# Patient Record
Sex: MALE | Race: WHITE | NOT HISPANIC OR LATINO | Employment: FULL TIME | URBAN - METROPOLITAN AREA
[De-identification: names, ages, dates, MRNs, and addresses within clinical notes are randomized per-mention and may not be internally consistent; named-entity substitution may affect disease eponyms.]

---

## 2023-09-12 PROBLEM — F32.A CHRONIC DEPRESSION: Status: ACTIVE | Noted: 2023-09-12

## 2023-09-12 PROBLEM — K21.9 GASTROESOPHAGEAL REFLUX DISEASE: Status: ACTIVE | Noted: 2023-09-12

## 2023-09-12 PROBLEM — E66.9 OBESITY: Status: ACTIVE | Noted: 2023-09-12

## 2023-09-12 PROBLEM — E78.5 HYPERLIPIDEMIA: Status: ACTIVE | Noted: 2023-09-12

## 2023-09-12 PROBLEM — T78.40XA ALLERGIC REACTION: Status: ACTIVE | Noted: 2023-09-12

## 2023-09-12 PROBLEM — I10 BENIGN HYPERTENSION: Status: ACTIVE | Noted: 2023-09-12

## 2023-09-12 RX ORDER — TRIAMCINOLONE ACETONIDE 1 MG/G
1 CREAM TOPICAL 2 TIMES DAILY
COMMUNITY
Start: 2022-06-23 | End: 2024-04-01 | Stop reason: WASHOUT

## 2023-09-12 RX ORDER — ESCITALOPRAM OXALATE 5 MG/1
1 TABLET ORAL DAILY
COMMUNITY
Start: 2015-12-14 | End: 2024-01-24 | Stop reason: ALTCHOICE

## 2023-09-12 RX ORDER — FLUTICASONE PROPIONATE 50 MCG
1 SPRAY, SUSPENSION (ML) NASAL DAILY
COMMUNITY
Start: 2018-03-23

## 2023-09-12 RX ORDER — ESCITALOPRAM OXALATE 10 MG/1
10 TABLET ORAL DAILY
COMMUNITY
Start: 2015-10-27

## 2023-09-12 RX ORDER — ATORVASTATIN CALCIUM 10 MG/1
10 TABLET, FILM COATED ORAL DAILY
COMMUNITY
Start: 2017-04-18

## 2023-09-12 RX ORDER — LOSARTAN POTASSIUM AND HYDROCHLOROTHIAZIDE 25; 100 MG/1; MG/1
1 TABLET ORAL DAILY
COMMUNITY
Start: 2015-10-27

## 2023-09-29 ENCOUNTER — APPOINTMENT (OUTPATIENT)
Dept: PRIMARY CARE | Facility: CLINIC | Age: 38
End: 2023-09-29

## 2023-09-29 ENCOUNTER — HOSPITAL ENCOUNTER (OUTPATIENT)
Dept: DATA CONVERSION | Facility: HOSPITAL | Age: 38
Discharge: HOME | End: 2023-09-29
Payer: COMMERCIAL

## 2023-09-29 DIAGNOSIS — E66.01 MORBID (SEVERE) OBESITY DUE TO EXCESS CALORIES (MULTI): ICD-10-CM

## 2023-09-29 LAB
ALBUMIN SERPL-MCNC: 4.5 GM/DL (ref 3.5–5)
ALBUMIN/GLOB SERPL: 1.5 RATIO (ref 1.5–3)
ALP BLD-CCNC: 78 U/L (ref 35–125)
ALT SERPL-CCNC: 28 U/L (ref 5–40)
ANION GAP SERPL CALCULATED.3IONS-SCNC: 13 MMOL/L (ref 0–19)
APPEARANCE PLAS: ABNORMAL
AST SERPL-CCNC: 27 U/L (ref 5–40)
BASOPHILS # BLD AUTO: 0.04 K/UL (ref 0–0.22)
BASOPHILS NFR BLD AUTO: 0.4 % (ref 0–1)
BILIRUB SERPL-MCNC: 1.4 MG/DL (ref 0.1–1.2)
BUN SERPL-MCNC: 11 MG/DL (ref 8–25)
BUN/CREAT SERPL: 12.2 RATIO (ref 8–21)
CALCIUM SERPL-MCNC: 10.2 MG/DL (ref 8.5–10.4)
CHLORIDE SERPL-SCNC: 100 MMOL/L (ref 97–107)
CHOLEST SERPL-MCNC: 177 MG/DL (ref 133–200)
CHOLEST/HDLC SERPL: 4.8 RATIO
CO2 SERPL-SCNC: 27 MMOL/L (ref 24–31)
COLOR SPUN FLD: YELLOW
CREAT SERPL-MCNC: 0.9 MG/DL (ref 0.4–1.6)
DEPRECATED RDW RBC AUTO: 41.9 FL (ref 37–54)
DIFFERENTIAL METHOD BLD: ABNORMAL
EOSINOPHIL # BLD AUTO: 0.14 K/UL (ref 0–0.45)
EOSINOPHIL NFR BLD: 1.6 % (ref 0–3)
ERYTHROCYTE [DISTWIDTH] IN BLOOD BY AUTOMATED COUNT: 13.2 % (ref 11.7–15)
FASTING STATUS PATIENT QL REPORTED: ABNORMAL
GFR SERPL CREATININE-BSD FRML MDRD: 111 ML/MIN/1.73 M2
GLOBULIN SER-MCNC: 3 G/DL (ref 1.9–3.7)
GLUCOSE SERPL-MCNC: 84 MG/DL (ref 65–99)
HCT VFR BLD AUTO: 45.4 % (ref 41–50)
HDLC SERPL-MCNC: 37 MG/DL
HGB BLD-MCNC: 15 GM/DL (ref 13.5–16.5)
IMM GRANULOCYTES # BLD AUTO: 0.03 K/UL (ref 0–0.1)
LDLC SERPL CALC-MCNC: 115 MG/DL (ref 65–130)
LYMPHOCYTES # BLD AUTO: 2.85 K/UL (ref 1.2–3.2)
LYMPHOCYTES NFR BLD MANUAL: 31.6 % (ref 20–40)
MCH RBC QN AUTO: 28.8 PG (ref 26–34)
MCHC RBC AUTO-ENTMCNC: 33 % (ref 31–37)
MCV RBC AUTO: 87.3 FL (ref 80–100)
MONOCYTES # BLD AUTO: 0.73 K/UL (ref 0–0.8)
MONOCYTES NFR BLD MANUAL: 8.1 % (ref 0–8)
NEUTROPHILS # BLD AUTO: 5.23 K/UL
NEUTROPHILS # BLD AUTO: 5.23 K/UL (ref 1.8–7.7)
NEUTROPHILS.IMMATURE NFR BLD: 0.3 % (ref 0–1)
NEUTS SEG NFR BLD: 58 % (ref 50–70)
NRBC BLD-RTO: 0 /100 WBC
PLATELET # BLD AUTO: 289 K/UL (ref 150–450)
PMV BLD AUTO: 10.6 CU (ref 7–12.6)
POTASSIUM SERPL-SCNC: 4.1 MMOL/L (ref 3.4–5.1)
PROT SERPL-MCNC: 7.5 G/DL (ref 5.9–7.9)
RBC # BLD AUTO: 5.2 M/UL (ref 4.5–5.5)
SODIUM SERPL-SCNC: 139 MMOL/L (ref 133–145)
TRIGL SERPL-MCNC: 123 MG/DL (ref 40–150)
TSH SERPL DL<=0.05 MIU/L-ACNC: 1.2 MIU/L (ref 0.27–4.2)
WBC # BLD AUTO: 9 K/UL (ref 4.5–11)

## 2023-11-01 ENCOUNTER — APPOINTMENT (OUTPATIENT)
Dept: CARE COORDINATION | Facility: CLINIC | Age: 38
End: 2023-11-01
Payer: COMMERCIAL

## 2023-12-12 ENCOUNTER — APPOINTMENT (OUTPATIENT)
Dept: CARE COORDINATION | Facility: CLINIC | Age: 38
End: 2023-12-12
Payer: COMMERCIAL

## 2023-12-15 ENCOUNTER — TELEPHONE (OUTPATIENT)
Dept: PRIMARY CARE | Facility: CLINIC | Age: 38
End: 2023-12-15
Payer: COMMERCIAL

## 2023-12-18 DIAGNOSIS — E66.09 OBESITY DUE TO EXCESS CALORIES, UNSPECIFIED CLASSIFICATION, UNSPECIFIED WHETHER SERIOUS COMORBIDITY PRESENT: Primary | ICD-10-CM

## 2023-12-21 ENCOUNTER — TELEPHONE (OUTPATIENT)
Dept: PRIMARY CARE | Facility: CLINIC | Age: 38
End: 2023-12-21
Payer: COMMERCIAL

## 2023-12-22 RX ORDER — TIRZEPATIDE 2.5 MG/.5ML
2.5 INJECTION, SOLUTION SUBCUTANEOUS
Qty: 2 ML | Refills: 0 | Status: SHIPPED | OUTPATIENT
Start: 2023-12-22 | End: 2024-01-22

## 2024-01-17 ENCOUNTER — APPOINTMENT (OUTPATIENT)
Dept: CARE COORDINATION | Facility: CLINIC | Age: 39
End: 2024-01-17
Payer: COMMERCIAL

## 2024-01-20 DIAGNOSIS — E66.09 OBESITY DUE TO EXCESS CALORIES, UNSPECIFIED CLASSIFICATION, UNSPECIFIED WHETHER SERIOUS COMORBIDITY PRESENT: ICD-10-CM

## 2024-01-22 RX ORDER — TIRZEPATIDE 2.5 MG/.5ML
2.5 INJECTION, SOLUTION SUBCUTANEOUS
Qty: 2 ML | Refills: 2 | Status: SHIPPED | OUTPATIENT
Start: 2024-01-22 | End: 2024-02-01 | Stop reason: DRUGHIGH

## 2024-01-23 ENCOUNTER — CLINICAL SUPPORT (OUTPATIENT)
Dept: CARE COORDINATION | Facility: CLINIC | Age: 39
End: 2024-01-23
Payer: COMMERCIAL

## 2024-01-24 ENCOUNTER — OFFICE VISIT (OUTPATIENT)
Dept: PULMONOLOGY | Facility: CLINIC | Age: 39
End: 2024-01-24
Payer: COMMERCIAL

## 2024-01-24 VITALS
WEIGHT: 285.8 LBS | BODY MASS INDEX: 41.01 KG/M2 | HEART RATE: 91 BPM | SYSTOLIC BLOOD PRESSURE: 133 MMHG | OXYGEN SATURATION: 96 % | DIASTOLIC BLOOD PRESSURE: 88 MMHG

## 2024-01-24 DIAGNOSIS — E66.01 CLASS 3 SEVERE OBESITY DUE TO EXCESS CALORIES WITHOUT SERIOUS COMORBIDITY WITH BODY MASS INDEX (BMI) OF 40.0 TO 44.9 IN ADULT (MULTI): ICD-10-CM

## 2024-01-24 DIAGNOSIS — G47.33 OSA (OBSTRUCTIVE SLEEP APNEA): Primary | ICD-10-CM

## 2024-01-24 PROCEDURE — 1036F TOBACCO NON-USER: CPT | Performed by: NURSE PRACTITIONER

## 2024-01-24 PROCEDURE — 3075F SYST BP GE 130 - 139MM HG: CPT | Performed by: NURSE PRACTITIONER

## 2024-01-24 PROCEDURE — 3008F BODY MASS INDEX DOCD: CPT | Performed by: NURSE PRACTITIONER

## 2024-01-24 PROCEDURE — 3079F DIAST BP 80-89 MM HG: CPT | Performed by: NURSE PRACTITIONER

## 2024-01-24 PROCEDURE — 99213 OFFICE O/P EST LOW 20 MIN: CPT | Performed by: NURSE PRACTITIONER

## 2024-01-24 NOTE — PROGRESS NOTES
Subjective   Patient ID: Jose Cain is a 38 y.o. male who presents for No chief complaint on file..  HPI  New patient her today to establish care. He complains of daytime fatigue , snoring and witnessed apneas. He also has HTN. He is very willing to have a sleep study. We discussed the benefits or in lab verses home study. Patient would like to have an in lab study. 5 minutes spent preparing patient's chart. 20 minutes spent with patient answering questions and determining care. 5 minutes spent charting and ordering tests and medications     07/25/23 He is here today for follow up. He is doing wonderful with his cpap. he uses it every night for 7.5 hours his AHI is 2.4. He is very happy with his cpap. He feels great. he is using the N20 airtouch mask. 5 minutes spent preparing patient's chart. 15 minutes spent with patient answering questions and determining care. 10 minutes spent charting and ordering tests and medications    1/24/24 he is here today with his CPAP.  He is doing great.  He uses his CPAP every night for 7.2 hours his AHI is 2.3.  His CPAP is set from 11-16 and his 95th percentile pressure is 14.  He feels rested with sleep.  He has noticed a considerable difference since starting CPAP.  He gets supplies regularly.         Sleep history:  Admits to feeling tired during the day.  Complains of snoring, witnessed Apneas. Feels tired during the day. Does not take frequent naps.  STOP-BANG score of 7     Comorbidities:   HTN  Obesity      SH:  smoking: never  drinking: none  illicit drug use: none     Occupation: (Full questionnaire on exposures obtained, discussed with the patient and scanned to EMR)  worked as [employer]  No known exposure to asbestos, silica or berylliu    Review of Systems   All other systems reviewed and are negative.      Objective   Physical Exam  Vitals and nursing note reviewed.   Constitutional:       Appearance: Normal appearance.   HENT:      Head: Normocephalic.      Nose:  "Nose normal.   Eyes:      Pupils: Pupils are equal, round, and reactive to light.   Pulmonary:      Effort: Pulmonary effort is normal.   Neurological:      General: No focal deficit present.      Mental Status: He is alert and oriented to person, place, and time. Mental status is at baseline.   Psychiatric:         Mood and Affect: Mood normal.         Behavior: Behavior normal.         Thought Content: Thought content normal.         Judgment: Judgment normal.         Assessment/Plan     # Sleep disturbance with snoring:  also have witness apneas, and daytime fatigue  -STOP-BANG score of 7  -High risk for CONSTANTINO  -Split sleep study ordered.     # Severe CONSTANTINO:   -He had a sleep study on 5/14/23 AHi was 86.6   07/25/23 he is doing great with his cpap using it every night for 7.5 hours, AHI is 2.4   - He feels great, he has more energy, and his mood is better. he is using the N20 airtouch mask  1/24/24 he is doing well on cpap, uses it every night for 7.2 hours, AHI is 2.3  -counseled to avoid supine sleeping. Can buy \"Dynamo Micropower\" online to help.  -discussed avoiding compliance measures, avoiding sedatives and alcohol and caution driving and operating machinery  -Obesity: discussed the importance of weight loss      HTN   - Controlled with medication   - f/u with PCP regularly      # Obesity/deconditioning:  -counseled on the importance of diet, exercise and weight loss     Mr. Cain it was a pleasure seeing you in the office today. We discussed the following:       - Great job on your cpap   - Keep up the good work     Follow up in 1 year     LEYLA Joel-CNP 01/24/24 3:16 PM   "

## 2024-01-24 NOTE — PROGRESS NOTES
Met with patient for nutrition counseling follow up. Patient is now 273 llbs and is so happy to see some weight loss. Recently started tirzepatide 2.5 mg. He reports a much lower appetite. He is still eating regular meals but is eating much smaller portions, cravings are decreased, and he is tracking calories with [employer] eve. He reports tracking is really helping to see any patterns. He has been successful with consistent water intake, he walked on the treadmill one day and plans to continue increasing. He has noticed he does not care for sweet drinks and foods like he used to. He has really adapted to new choices and new flavors. He is very consistent with breakfast and prepares it ahead of time. When he goes out to eat he has been choosing a healthier option. He also reports eating slower and being more mindful before he chooses to eat something. We discussed continuing doing what he is doing, continuing to work on establishing a consistent routine on the treadmill, and as a reminder being mindful of including protein every time he eats.

## 2024-01-24 NOTE — PATIENT INSTRUCTIONS
Mr. Cain it was a pleasure seeing you in the office today. We discussed the following:       - Great job on your cpap   - Keep up the good work     Follow up in 1 year

## 2024-02-01 ENCOUNTER — OFFICE VISIT (OUTPATIENT)
Dept: PRIMARY CARE | Facility: CLINIC | Age: 39
End: 2024-02-01
Payer: COMMERCIAL

## 2024-02-01 VITALS
SYSTOLIC BLOOD PRESSURE: 120 MMHG | DIASTOLIC BLOOD PRESSURE: 80 MMHG | RESPIRATION RATE: 16 BRPM | WEIGHT: 279 LBS | TEMPERATURE: 96.8 F | HEIGHT: 70 IN | BODY MASS INDEX: 39.94 KG/M2 | HEART RATE: 80 BPM

## 2024-02-01 DIAGNOSIS — E66.01 CLASS 3 SEVERE OBESITY DUE TO EXCESS CALORIES WITHOUT SERIOUS COMORBIDITY WITH BODY MASS INDEX (BMI) OF 40.0 TO 44.9 IN ADULT (MULTI): Primary | ICD-10-CM

## 2024-02-01 DIAGNOSIS — E78.2 MIXED HYPERLIPIDEMIA: ICD-10-CM

## 2024-02-01 DIAGNOSIS — F32.A CHRONIC DEPRESSION: ICD-10-CM

## 2024-02-01 DIAGNOSIS — I10 BENIGN HYPERTENSION: ICD-10-CM

## 2024-02-01 PROCEDURE — 99395 PREV VISIT EST AGE 18-39: CPT | Performed by: FAMILY MEDICINE

## 2024-02-01 PROCEDURE — 99213 OFFICE O/P EST LOW 20 MIN: CPT | Performed by: FAMILY MEDICINE

## 2024-02-01 PROCEDURE — 3074F SYST BP LT 130 MM HG: CPT | Performed by: FAMILY MEDICINE

## 2024-02-01 PROCEDURE — 3008F BODY MASS INDEX DOCD: CPT | Performed by: FAMILY MEDICINE

## 2024-02-01 PROCEDURE — 1036F TOBACCO NON-USER: CPT | Performed by: FAMILY MEDICINE

## 2024-02-01 PROCEDURE — 3079F DIAST BP 80-89 MM HG: CPT | Performed by: FAMILY MEDICINE

## 2024-02-01 RX ORDER — TIRZEPATIDE 5 MG/.5ML
5 INJECTION, SOLUTION SUBCUTANEOUS
Qty: 2 ML | Refills: 3 | Status: SHIPPED | OUTPATIENT
Start: 2024-02-01 | End: 2024-04-01 | Stop reason: SDUPTHER

## 2024-02-01 ASSESSMENT — ENCOUNTER SYMPTOMS
MUSCULOSKELETAL NEGATIVE: 1
DEPRESSION: 0
NEUROLOGICAL NEGATIVE: 1
OCCASIONAL FEELINGS OF UNSTEADINESS: 0
LOSS OF SENSATION IN FEET: 0
GASTROINTESTINAL NEGATIVE: 1
CARDIOVASCULAR NEGATIVE: 1
RESPIRATORY NEGATIVE: 1
CONSTITUTIONAL NEGATIVE: 1

## 2024-02-01 ASSESSMENT — PATIENT HEALTH QUESTIONNAIRE - PHQ9
2. FEELING DOWN, DEPRESSED OR HOPELESS: NOT AT ALL
1. LITTLE INTEREST OR PLEASURE IN DOING THINGS: NOT AT ALL
SUM OF ALL RESPONSES TO PHQ9 QUESTIONS 1 AND 2: 0

## 2024-02-01 ASSESSMENT — COLUMBIA-SUICIDE SEVERITY RATING SCALE - C-SSRS
6. HAVE YOU EVER DONE ANYTHING, STARTED TO DO ANYTHING, OR PREPARED TO DO ANYTHING TO END YOUR LIFE?: NO
1. IN THE PAST MONTH, HAVE YOU WISHED YOU WERE DEAD OR WISHED YOU COULD GO TO SLEEP AND NOT WAKE UP?: NO
2. HAVE YOU ACTUALLY HAD ANY THOUGHTS OF KILLING YOURSELF?: NO

## 2024-02-01 ASSESSMENT — PAIN SCALES - GENERAL: PAINLEVEL: 0-NO PAIN

## 2024-02-01 NOTE — PROGRESS NOTES
"Subjective   Patient ID: Jose Cain is a 38 y.o. male who presents for Follow-up (Pt is here for mounjaro follow up appt. ).    HPI he has lost 13.9 lbs.  No side effects doing well.  Has been on it for about a month.  It helps his apatite greatly.      Review of Systems   Constitutional: Negative.    HENT: Negative.     Respiratory: Negative.     Cardiovascular: Negative.    Gastrointestinal: Negative.    Genitourinary: Negative.    Musculoskeletal: Negative.    Neurological: Negative.        Objective   /80 (BP Location: Left arm, Patient Position: Sitting, BP Cuff Size: Adult)   Pulse 80   Temp 36 °C (96.8 °F) (Temporal)   Resp 16   Ht 1.778 m (5' 10\")   Wt 127 kg (279 lb)   BMI 40.03 kg/m²     Physical Exam  Constitutional:       General: He is not in acute distress.     Appearance: Normal appearance.   Cardiovascular:      Rate and Rhythm: Normal rate and regular rhythm.      Heart sounds: No murmur heard.  Pulmonary:      Breath sounds: Normal breath sounds. No wheezing.   Neurological:      Mental Status: He is alert.         Assessment/Plan   Problem List Items Addressed This Visit             ICD-10-CM    Benign hypertension I10    Chronic depression F32.A    Hyperlipidemia E78.5    Obesity - Primary E66.9    Relevant Medications    tirzepatide (Mounjaro) 5 mg/0.5 mL pen injector     Incresease to 5 mg and recheck 8-9 wks.  Continue diet/exercise etc.   Pt requested check up also and paperwork completed for pt.      "

## 2024-02-29 ENCOUNTER — TELEPHONE (OUTPATIENT)
Dept: PRIMARY CARE | Facility: CLINIC | Age: 39
End: 2024-02-29

## 2024-03-12 ENCOUNTER — APPOINTMENT (OUTPATIENT)
Dept: CARE COORDINATION | Facility: CLINIC | Age: 39
End: 2024-03-12
Payer: COMMERCIAL

## 2024-03-25 ENCOUNTER — CLINICAL SUPPORT (OUTPATIENT)
Dept: CARE COORDINATION | Facility: CLINIC | Age: 39
End: 2024-03-25
Payer: COMMERCIAL

## 2024-03-26 NOTE — PROGRESS NOTES
Met with patient for nutrition counseling follow up. His weight is 275 lbs in office today. At home he is about 263 lbs. Our visit was later in the day with all of his work gear on. He went to [place] recently to celebrate his 39th birthday, he states his weight was the same after the trip. He reports still tracking his food/calories, his appetite is still pretty low depending on the day. He reports just skipping breakfast since he is not hungry but he misses the energy he had when he was eating breakfast. He is still contemplating how to fit exercise into his routine. We discussed the idea of going to [employer] one day a week to start, he did enjoy going in the past. He is really happy that his blood pressure has improved a lot since working on lifestyle changes. He feels really good taking a different approach and does not feel deprived like he has in the past. We reviewed some ideas for a quick breakfast when appetite is low: fruit, nuts, or protein shake. His goal is to get into the 250's by our next visit.

## 2024-04-01 ENCOUNTER — OFFICE VISIT (OUTPATIENT)
Dept: PRIMARY CARE | Facility: CLINIC | Age: 39
End: 2024-04-01
Payer: COMMERCIAL

## 2024-04-01 VITALS
RESPIRATION RATE: 16 BRPM | OXYGEN SATURATION: 97 % | SYSTOLIC BLOOD PRESSURE: 134 MMHG | BODY MASS INDEX: 38.94 KG/M2 | HEART RATE: 88 BPM | WEIGHT: 272 LBS | HEIGHT: 70 IN | TEMPERATURE: 97.1 F | DIASTOLIC BLOOD PRESSURE: 84 MMHG

## 2024-04-01 DIAGNOSIS — E66.01 CLASS 3 SEVERE OBESITY DUE TO EXCESS CALORIES WITHOUT SERIOUS COMORBIDITY WITH BODY MASS INDEX (BMI) OF 40.0 TO 44.9 IN ADULT (MULTI): ICD-10-CM

## 2024-04-01 PROCEDURE — 3008F BODY MASS INDEX DOCD: CPT | Performed by: FAMILY MEDICINE

## 2024-04-01 PROCEDURE — 99213 OFFICE O/P EST LOW 20 MIN: CPT | Performed by: FAMILY MEDICINE

## 2024-04-01 PROCEDURE — 1036F TOBACCO NON-USER: CPT | Performed by: FAMILY MEDICINE

## 2024-04-01 PROCEDURE — 3079F DIAST BP 80-89 MM HG: CPT | Performed by: FAMILY MEDICINE

## 2024-04-01 PROCEDURE — 3075F SYST BP GE 130 - 139MM HG: CPT | Performed by: FAMILY MEDICINE

## 2024-04-01 RX ORDER — TIRZEPATIDE 5 MG/.5ML
5 INJECTION, SOLUTION SUBCUTANEOUS
Qty: 2 ML | Refills: 3 | Status: SHIPPED | OUTPATIENT
Start: 2024-04-01

## 2024-04-01 ASSESSMENT — COLUMBIA-SUICIDE SEVERITY RATING SCALE - C-SSRS
6. HAVE YOU EVER DONE ANYTHING, STARTED TO DO ANYTHING, OR PREPARED TO DO ANYTHING TO END YOUR LIFE?: NO
2. HAVE YOU ACTUALLY HAD ANY THOUGHTS OF KILLING YOURSELF?: NO
1. IN THE PAST MONTH, HAVE YOU WISHED YOU WERE DEAD OR WISHED YOU COULD GO TO SLEEP AND NOT WAKE UP?: NO

## 2024-04-01 ASSESSMENT — ENCOUNTER SYMPTOMS
LOSS OF SENSATION IN FEET: 0
DEPRESSION: 0
OCCASIONAL FEELINGS OF UNSTEADINESS: 0

## 2024-04-01 ASSESSMENT — PAIN SCALES - GENERAL: PAINLEVEL: 0-NO PAIN

## 2024-04-01 ASSESSMENT — PATIENT HEALTH QUESTIONNAIRE - PHQ9
2. FEELING DOWN, DEPRESSED OR HOPELESS: NOT AT ALL
SUM OF ALL RESPONSES TO PHQ9 QUESTIONS 1 AND 2: 0
1. LITTLE INTEREST OR PLEASURE IN DOING THINGS: NOT AT ALL

## 2024-04-01 NOTE — PROGRESS NOTES
"Subjective   Patient ID: Jose Cain is a 39 y.o. male who presents for Follow-up (Pt is here for his 2 month mounjaro appt.).    HPI His wt has been improving and has already lost 25 lbs.  No nausea or constipation.  Slight uneasy stomach a couple of days after the injection.  Diet is doing well .  Mild exercise.  He is getting  at the end of May    Review of Systems  See HPI  Objective   /84 (BP Location: Left arm, Patient Position: Sitting, BP Cuff Size: Adult)   Pulse 88   Temp 36.2 °C (97.1 °F) (Temporal)   Resp 16   Ht 1.778 m (5' 10\")   Wt 123 kg (272 lb)   SpO2 97%   BMI 39.03 kg/m²     Physical Exam  Constitutional:       General: He is not in acute distress.     Appearance: Normal appearance.   Cardiovascular:      Rate and Rhythm: Normal rate and regular rhythm.      Heart sounds: No murmur heard.  Pulmonary:      Breath sounds: Normal breath sounds. No wheezing.   Neurological:      Mental Status: He is alert.         Assessment/Plan   Problem List Items Addressed This Visit             ICD-10-CM    Obesity E66.9    Relevant Medications    tirzepatide (Mounjaro) 5 mg/0.5 mL pen injector   Recheck 3 months       "

## 2024-04-03 ENCOUNTER — OFFICE VISIT (OUTPATIENT)
Dept: PRIMARY CARE | Facility: CLINIC | Age: 39
End: 2024-04-03
Payer: COMMERCIAL

## 2024-04-03 VITALS
BODY MASS INDEX: 38.22 KG/M2 | TEMPERATURE: 98.8 F | SYSTOLIC BLOOD PRESSURE: 120 MMHG | WEIGHT: 267 LBS | HEIGHT: 70 IN | OXYGEN SATURATION: 98 % | HEART RATE: 68 BPM | RESPIRATION RATE: 20 BRPM | DIASTOLIC BLOOD PRESSURE: 77 MMHG

## 2024-04-03 DIAGNOSIS — F32.A CHRONIC DEPRESSION: Primary | ICD-10-CM

## 2024-04-03 PROCEDURE — 3008F BODY MASS INDEX DOCD: CPT | Performed by: FAMILY MEDICINE

## 2024-04-03 PROCEDURE — 3078F DIAST BP <80 MM HG: CPT | Performed by: FAMILY MEDICINE

## 2024-04-03 PROCEDURE — 1036F TOBACCO NON-USER: CPT | Performed by: FAMILY MEDICINE

## 2024-04-03 PROCEDURE — 3074F SYST BP LT 130 MM HG: CPT | Performed by: FAMILY MEDICINE

## 2024-04-03 PROCEDURE — 99213 OFFICE O/P EST LOW 20 MIN: CPT | Performed by: FAMILY MEDICINE

## 2024-04-03 ASSESSMENT — PATIENT HEALTH QUESTIONNAIRE - PHQ9
1. LITTLE INTEREST OR PLEASURE IN DOING THINGS: NOT AT ALL
2. FEELING DOWN, DEPRESSED OR HOPELESS: NOT AT ALL
SUM OF ALL RESPONSES TO PHQ9 QUESTIONS 1 AND 2: 0

## 2024-04-03 ASSESSMENT — ENCOUNTER SYMPTOMS
CONSTITUTIONAL NEGATIVE: 1
LOSS OF SENSATION IN FEET: 0
MUSCULOSKELETAL NEGATIVE: 1
CARDIOVASCULAR NEGATIVE: 1
OCCASIONAL FEELINGS OF UNSTEADINESS: 0
GASTROINTESTINAL NEGATIVE: 1
NEUROLOGICAL NEGATIVE: 1
RESPIRATORY NEGATIVE: 1
DEPRESSION: 0

## 2024-04-03 ASSESSMENT — PAIN SCALES - GENERAL: PAINLEVEL: 0-NO PAIN

## 2024-04-03 NOTE — PROGRESS NOTES
"Subjective   Patient ID: Jose Cain is a 39 y.o. male who presents for Follow-up (PATIENT IS HERE TO GET A LETTER STATING HE IS FIT FOR DUTY HE WORKS FOR THE [employer]).    HPI He has been on the Lexapro for many years doing well/stable.  No suicidal thoughts or recent increased depression.  No recent psychiatry visits and did see one in early high school.   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  He has paperwork to fill out.  He has never been hospitalized for psychiatric issues.    Review of Systems   Constitutional: Negative.    HENT: Negative.     Respiratory: Negative.     Cardiovascular: Negative.    Gastrointestinal: Negative.    Genitourinary: Negative.    Musculoskeletal: Negative.    Neurological: Negative.        Objective   /77 (BP Location: Right arm, Patient Position: Sitting, BP Cuff Size: Adult)   Pulse 68   Temp 37.1 °C (98.8 °F) (Skin)   Resp 20   Ht 1.778 m (5' 10\")   Wt 121 kg (267 lb)   SpO2 98%   BMI 38.31 kg/m²     Physical Exam  Constitutional:       General: He is not in acute distress.     Appearance: Normal appearance.   Cardiovascular:      Rate and Rhythm: Normal rate and regular rhythm.      Heart sounds: No murmur heard.  Pulmonary:      Breath sounds: Normal breath sounds. No wheezing.   Neurological:      Mental Status: He is alert.         Assessment/Plan   Problem List Items Addressed This Visit             ICD-10-CM    Chronic depression - Primary F32.A   Doing well and stable for years.  Paperwork completed and follow up prn       "

## 2024-04-08 DIAGNOSIS — J40 BRONCHITIS: ICD-10-CM

## 2024-04-08 DIAGNOSIS — J01.10 ACUTE NON-RECURRENT FRONTAL SINUSITIS: Primary | ICD-10-CM

## 2024-04-08 RX ORDER — METHYLPREDNISOLONE 4 MG/1
TABLET ORAL
Qty: 21 TABLET | Refills: 0 | Status: SHIPPED | OUTPATIENT
Start: 2024-04-08

## 2024-04-08 RX ORDER — AMOXICILLIN AND CLAVULANATE POTASSIUM 500; 125 MG/1; MG/1
1 TABLET, FILM COATED ORAL 2 TIMES DAILY
Qty: 20 TABLET | Refills: 0 | Status: SHIPPED | OUTPATIENT
Start: 2024-04-08 | End: 2024-04-18

## 2024-04-29 ENCOUNTER — APPOINTMENT (OUTPATIENT)
Dept: CARE COORDINATION | Facility: CLINIC | Age: 39
End: 2024-04-29
Payer: COMMERCIAL

## 2024-05-01 ENCOUNTER — CLINICAL SUPPORT (OUTPATIENT)
Dept: CARE COORDINATION | Facility: CLINIC | Age: 39
End: 2024-05-01
Payer: COMMERCIAL

## 2024-05-01 NOTE — PROGRESS NOTES
Met with patient for nutrition counseling follow up. His weight in office is 266 lbs, he reports being 258 lbs on his home scale. The pharmacy was not able to fill his Mounjaro so he is now off it. He is trying the supplement berberine and feels it is helping. He has been more consistent with breakfast, he is eating chocolate rice cakes with peanut butter. He states this holds him over until lunch. He is doing well overcoming the mental food chatter and is continuing to be successful making healthier choices and eating smaller portions. He is very mindful and keeps his long-term goals a priority. He has been successful avoiding the snacks at work. He is still in the preparation stage of his exercise plan. He has some great ideas for getting back to the gym and plans on starting after the wedding.

## 2024-06-11 ENCOUNTER — CLINICAL SUPPORT (OUTPATIENT)
Dept: CARE COORDINATION | Facility: CLINIC | Age: 39
End: 2024-06-11
Payer: COMMERCIAL

## 2024-06-12 NOTE — PROGRESS NOTES
Met with patient for nutrition counseling follow up. Weight in office later in the day with work boots on was 267 lbs. He fluctuates but has maintained his weight in the past month. They got  and went on a honeymoon, so he is happy he didn't gain weight. He has been on and off mounjaro due to shortages. He is taking berberine on and off. He is still serious about continuing weight loss efforts. He discussed making plans with his brother to get back to the gym. He had a climbing test for work, 275 ft up. This adds motivation for him to continue losing weight because he knows the task would be easier. He has done such a great job eating smaller portions, choosing healthier snacks, decreasing high sugar drinks, increasing water, paying attention to fullness, and has been very consistent with these habits. We discussed getting re-focused and creating an exercise routine. All questions answered, follow up planned for 7/31.

## 2024-07-01 ENCOUNTER — OFFICE VISIT (OUTPATIENT)
Dept: PRIMARY CARE | Facility: CLINIC | Age: 39
End: 2024-07-01
Payer: COMMERCIAL

## 2024-07-01 VITALS
WEIGHT: 261 LBS | DIASTOLIC BLOOD PRESSURE: 82 MMHG | HEART RATE: 86 BPM | OXYGEN SATURATION: 98 % | HEIGHT: 70 IN | RESPIRATION RATE: 16 BRPM | BODY MASS INDEX: 37.37 KG/M2 | SYSTOLIC BLOOD PRESSURE: 118 MMHG | TEMPERATURE: 97.2 F

## 2024-07-01 DIAGNOSIS — E66.01 CLASS 3 SEVERE OBESITY DUE TO EXCESS CALORIES WITHOUT SERIOUS COMORBIDITY WITH BODY MASS INDEX (BMI) OF 40.0 TO 44.9 IN ADULT (MULTI): ICD-10-CM

## 2024-07-01 PROCEDURE — 99213 OFFICE O/P EST LOW 20 MIN: CPT | Performed by: FAMILY MEDICINE

## 2024-07-01 PROCEDURE — 3079F DIAST BP 80-89 MM HG: CPT | Performed by: FAMILY MEDICINE

## 2024-07-01 PROCEDURE — 3008F BODY MASS INDEX DOCD: CPT | Performed by: FAMILY MEDICINE

## 2024-07-01 PROCEDURE — 3074F SYST BP LT 130 MM HG: CPT | Performed by: FAMILY MEDICINE

## 2024-07-01 PROCEDURE — 1036F TOBACCO NON-USER: CPT | Performed by: FAMILY MEDICINE

## 2024-07-01 RX ORDER — TIRZEPATIDE 5 MG/.5ML
5 INJECTION, SOLUTION SUBCUTANEOUS
Qty: 2 ML | Refills: 3 | Status: SHIPPED | OUTPATIENT
Start: 2024-07-01

## 2024-07-01 ASSESSMENT — COLUMBIA-SUICIDE SEVERITY RATING SCALE - C-SSRS
1. IN THE PAST MONTH, HAVE YOU WISHED YOU WERE DEAD OR WISHED YOU COULD GO TO SLEEP AND NOT WAKE UP?: NO
2. HAVE YOU ACTUALLY HAD ANY THOUGHTS OF KILLING YOURSELF?: NO
6. HAVE YOU EVER DONE ANYTHING, STARTED TO DO ANYTHING, OR PREPARED TO DO ANYTHING TO END YOUR LIFE?: NO

## 2024-07-01 ASSESSMENT — ENCOUNTER SYMPTOMS
LOSS OF SENSATION IN FEET: 0
MUSCULOSKELETAL NEGATIVE: 1
DEPRESSION: 0
CARDIOVASCULAR NEGATIVE: 1
GASTROINTESTINAL NEGATIVE: 1
RESPIRATORY NEGATIVE: 1
CONSTITUTIONAL NEGATIVE: 1
NEUROLOGICAL NEGATIVE: 1
OCCASIONAL FEELINGS OF UNSTEADINESS: 0

## 2024-07-01 ASSESSMENT — PAIN SCALES - GENERAL: PAINLEVEL: 0-NO PAIN

## 2024-07-01 NOTE — PROGRESS NOTES
"Subjective   Patient ID: Jose Cain is a 39 y.o. male who presents for Follow-up (Pt is here for Mounjaro follow up and medication refills. ).    HPI He is on the 5 mg mounjaro currently no side effects.  He was off for a month due to availability and has been back on for about a month.   He has has lost almost 30 lbs.     Review of Systems   Constitutional: Negative.    HENT: Negative.     Respiratory: Negative.     Cardiovascular: Negative.    Gastrointestinal: Negative.    Genitourinary: Negative.    Musculoskeletal: Negative.    Neurological: Negative.        Objective   /82 (BP Location: Left arm, Patient Position: Sitting, BP Cuff Size: Adult)   Pulse 86   Temp 36.2 °C (97.2 °F) (Temporal)   Resp 16   Ht 1.778 m (5' 10\")   Wt 118 kg (261 lb)   SpO2 98%   BMI 37.45 kg/m²     Physical Exam  Constitutional:       General: He is not in acute distress.     Appearance: Normal appearance.   Cardiovascular:      Rate and Rhythm: Normal rate and regular rhythm.      Heart sounds: No murmur heard.  Pulmonary:      Breath sounds: Normal breath sounds. No wheezing.   Neurological:      Mental Status: He is alert.         Assessment/Plan   Problem List Items Addressed This Visit             ICD-10-CM    Obesity E66.9    Relevant Medications    tirzepatide (Mounjaro) 5 mg/0.5 mL pen injector   Continue current dose and diet etc recheck 3 months.        "

## 2024-07-31 ENCOUNTER — APPOINTMENT (OUTPATIENT)
Dept: CARE COORDINATION | Facility: CLINIC | Age: 39
End: 2024-07-31
Payer: COMMERCIAL

## 2024-07-31 ENCOUNTER — PATIENT MESSAGE (OUTPATIENT)
Dept: CARE COORDINATION | Facility: CLINIC | Age: 39
End: 2024-07-31

## 2024-08-28 ENCOUNTER — APPOINTMENT (OUTPATIENT)
Dept: CARE COORDINATION | Facility: CLINIC | Age: 39
End: 2024-08-28
Payer: COMMERCIAL

## 2024-08-29 NOTE — PROGRESS NOTES
"Nutrition Follow-up   Dietitian follow-up. Pt is being seen at [facility]. Last visit was 6/11    Labs  Lab Results   Component Value Date     09/29/2023    K 4.1 09/29/2023     09/29/2023    CO2 27 09/29/2023    BUN 11 09/29/2023    CREATININE 0.9 09/29/2023    CALCIUM 10.2 09/29/2023    ALBUMIN 4.5 09/29/2023    PROT 7.5 09/29/2023    BILITOT 1.4 (H) 09/29/2023    ALKPHOS 78 09/29/2023    ALT 28 09/29/2023    AST 27 09/29/2023    GLUCOSE 84 09/29/2023       Lab Results   Component Value Date    CHOL 177 09/29/2023    LDLCALC 115 09/29/2023    TRIG 123 09/29/2023    HDL 37 (L) 09/29/2023          Anthropometrics  Ht Readings from Last 1 Encounters:   07/01/24 1.778 m (5' 10\")       BMI Readings from Last 1 Encounters:   07/01/24 37.45 kg/m²       Wt Readings from Last 10 Encounters:   07/01/24 118 kg (261 lb)   04/03/24 121 kg (267 lb)   04/01/24 123 kg (272 lb)   02/01/24 127 kg (279 lb)   01/24/24 130 kg (285 lb 12.8 oz)   09/29/23 132 kg (290 lb)   09/01/23 131 kg (289 lb)   07/25/23 132 kg (291 lb 7 oz)   04/20/23 126 kg (277 lb 12.8 oz)   06/23/22 126 kg (277 lb)       Weight change:     Jose reports his weight is currently 249 lbs on home scale. He is wearing a smaller clothing size, happy to be seeing more results.  Visit Summary  He is back on mounjuaro, also still taking berberine, but not consistently.   He is still limiting eating out and fast food, he is happy this saves money too. He continues to limit harman, usually just on Fridays for a treat. His wife makes coffee at home. He has been packing leftovers frequently for work lunches. He reports still doing really well eating smaller portions, choosing healthier snacks. Breakfast is hit or miss, he is often not hungry. Might have rice cakes when he gets to work. Drinking a lot of water, pop rarely, sweet tea sneaking back in d/t summer. Alcohol rarely. He has random cravings sometimes, he continues to practice mindfulness when having a " craving and when he has a treat, he has a small amount. He has been active getting more steps in, yard work, he likes being outside, lifting doing tasks in the garage. Still wants to start an exercise routine, maybe when the weather turns.      Nutrition Diagnosis:  Diagnosis Statement 1:  Diagnosis Status: Ongoing  Diagnosis : Overweight/Obese related to excessive energy intake as evidenced by weight gain      Nutrition Goals    No new goals at this time, discussed continuing to be consistent with what he has been doing.      Follow up Plan  Scheduled for 10/15

## 2024-09-01 DIAGNOSIS — F32.A CHRONIC DEPRESSION: ICD-10-CM

## 2024-09-01 DIAGNOSIS — I10 BENIGN HYPERTENSION: ICD-10-CM

## 2024-09-01 DIAGNOSIS — E78.2 MIXED HYPERLIPIDEMIA: ICD-10-CM

## 2024-09-03 RX ORDER — ESCITALOPRAM OXALATE 10 MG/1
10 TABLET ORAL DAILY
Qty: 90 TABLET | Refills: 3 | Status: SHIPPED | OUTPATIENT
Start: 2024-09-03

## 2024-09-03 RX ORDER — LOSARTAN POTASSIUM AND HYDROCHLOROTHIAZIDE 25; 100 MG/1; MG/1
1 TABLET ORAL DAILY
Qty: 90 TABLET | Refills: 3 | Status: SHIPPED | OUTPATIENT
Start: 2024-09-03 | End: 2025-08-29

## 2024-09-03 RX ORDER — ATORVASTATIN CALCIUM 10 MG/1
10 TABLET, FILM COATED ORAL DAILY
Qty: 90 TABLET | Refills: 3 | Status: SHIPPED | OUTPATIENT
Start: 2024-09-03 | End: 2025-08-29

## 2024-10-01 ENCOUNTER — OFFICE VISIT (OUTPATIENT)
Dept: PRIMARY CARE | Facility: CLINIC | Age: 39
End: 2024-10-01
Payer: COMMERCIAL

## 2024-10-01 VITALS
HEIGHT: 70 IN | WEIGHT: 253 LBS | OXYGEN SATURATION: 98 % | RESPIRATION RATE: 20 BRPM | DIASTOLIC BLOOD PRESSURE: 80 MMHG | SYSTOLIC BLOOD PRESSURE: 128 MMHG | TEMPERATURE: 98 F | BODY MASS INDEX: 36.22 KG/M2 | HEART RATE: 67 BPM

## 2024-10-01 DIAGNOSIS — I10 BENIGN HYPERTENSION: Primary | ICD-10-CM

## 2024-10-01 DIAGNOSIS — E66.01 CLASS 3 SEVERE OBESITY DUE TO EXCESS CALORIES WITHOUT SERIOUS COMORBIDITY WITH BODY MASS INDEX (BMI) OF 40.0 TO 44.9 IN ADULT: ICD-10-CM

## 2024-10-01 DIAGNOSIS — E66.813 CLASS 3 SEVERE OBESITY DUE TO EXCESS CALORIES WITHOUT SERIOUS COMORBIDITY WITH BODY MASS INDEX (BMI) OF 40.0 TO 44.9 IN ADULT: ICD-10-CM

## 2024-10-01 PROCEDURE — 3074F SYST BP LT 130 MM HG: CPT | Performed by: FAMILY MEDICINE

## 2024-10-01 PROCEDURE — 3008F BODY MASS INDEX DOCD: CPT | Performed by: FAMILY MEDICINE

## 2024-10-01 PROCEDURE — 3079F DIAST BP 80-89 MM HG: CPT | Performed by: FAMILY MEDICINE

## 2024-10-01 PROCEDURE — 99213 OFFICE O/P EST LOW 20 MIN: CPT | Performed by: FAMILY MEDICINE

## 2024-10-01 RX ORDER — TIRZEPATIDE 5 MG/.5ML
5 INJECTION, SOLUTION SUBCUTANEOUS
Qty: 6 ML | Refills: 1 | Status: SHIPPED | OUTPATIENT
Start: 2024-10-01

## 2024-10-01 ASSESSMENT — ENCOUNTER SYMPTOMS
OCCASIONAL FEELINGS OF UNSTEADINESS: 0
DEPRESSION: 0
LOSS OF SENSATION IN FEET: 0

## 2024-10-01 ASSESSMENT — PAIN SCALES - GENERAL: PAINLEVEL: 0-NO PAIN

## 2024-10-01 NOTE — PROGRESS NOTES
"Subjective   Patient ID: Jose Cain is a 39 y.o. male who presents for Follow-up (PATIENT HERE FOR FOLLOW UP FOR MOUNJARO).    HPI continues to do well with no significant side effects.  He has lost about 10 lbs since last visit.    No orthostasis.     Review of Systems   Constitutional: Negative.    HENT: Negative.     Respiratory: Negative.     Cardiovascular: Negative.    Gastrointestinal: Negative.    Genitourinary: Negative.    Musculoskeletal: Negative.    Neurological: Negative.        Objective   /80 (BP Location: Right arm, Patient Position: Sitting, BP Cuff Size: Adult)   Pulse 67   Temp 36.7 °C (98 °F) (Skin)   Resp 20   Ht 1.778 m (5' 10\")   Wt 115 kg (253 lb)   SpO2 98%   BMI 36.30 kg/m²     Physical Exam  Constitutional:       General: He is not in acute distress.     Appearance: Normal appearance.   Cardiovascular:      Rate and Rhythm: Normal rate and regular rhythm.      Heart sounds: No murmur heard.  Pulmonary:      Breath sounds: Normal breath sounds. No wheezing.   Neurological:      Mental Status: He is alert.         Assessment/Plan   Problem List Items Addressed This Visit             ICD-10-CM    Obesity E66.9    Relevant Medications    tirzepatide (Mounjaro) 5 mg/0.5 mL pen injector     Continue meds and diet/exercise.  Recheck 3 months.  Doing well currently.  Monitor bp and call if any dizziness.      "

## 2024-10-06 ASSESSMENT — ENCOUNTER SYMPTOMS
RESPIRATORY NEGATIVE: 1
NEUROLOGICAL NEGATIVE: 1
MUSCULOSKELETAL NEGATIVE: 1
GASTROINTESTINAL NEGATIVE: 1
CARDIOVASCULAR NEGATIVE: 1
CONSTITUTIONAL NEGATIVE: 1

## 2024-10-15 ENCOUNTER — APPOINTMENT (OUTPATIENT)
Dept: CARE COORDINATION | Facility: CLINIC | Age: 39
End: 2024-10-15
Payer: COMMERCIAL

## 2024-11-01 ENCOUNTER — DOCUMENTATION (OUTPATIENT)
Dept: CARE COORDINATION | Facility: CLINIC | Age: 39
End: 2024-11-01
Payer: COMMERCIAL

## 2024-11-08 ENCOUNTER — OFFICE VISIT (OUTPATIENT)
Dept: PRIMARY CARE | Facility: CLINIC | Age: 39
End: 2024-11-08
Payer: COMMERCIAL

## 2024-11-08 VITALS
HEIGHT: 70 IN | DIASTOLIC BLOOD PRESSURE: 74 MMHG | RESPIRATION RATE: 20 BRPM | WEIGHT: 245 LBS | BODY MASS INDEX: 35.07 KG/M2 | OXYGEN SATURATION: 98 % | HEART RATE: 67 BPM | SYSTOLIC BLOOD PRESSURE: 122 MMHG | TEMPERATURE: 98.7 F

## 2024-11-08 DIAGNOSIS — L23.7 POISON IVY DERMATITIS: Primary | ICD-10-CM

## 2024-11-08 PROCEDURE — 3008F BODY MASS INDEX DOCD: CPT | Performed by: REGISTERED NURSE

## 2024-11-08 PROCEDURE — 3074F SYST BP LT 130 MM HG: CPT | Performed by: REGISTERED NURSE

## 2024-11-08 PROCEDURE — 1036F TOBACCO NON-USER: CPT | Performed by: REGISTERED NURSE

## 2024-11-08 PROCEDURE — 3078F DIAST BP <80 MM HG: CPT | Performed by: REGISTERED NURSE

## 2024-11-08 PROCEDURE — 99214 OFFICE O/P EST MOD 30 MIN: CPT | Performed by: REGISTERED NURSE

## 2024-11-08 RX ORDER — FLUOCINONIDE 0.5 MG/G
OINTMENT TOPICAL 2 TIMES DAILY PRN
Qty: 60 G | Refills: 0 | Status: SHIPPED | OUTPATIENT
Start: 2024-11-08 | End: 2025-11-08

## 2024-11-08 RX ORDER — METHYLPREDNISOLONE SODIUM SUCCINATE 125 MG/2ML
125 INJECTION INTRAMUSCULAR; INTRAVENOUS ONCE
Status: COMPLETED | OUTPATIENT
Start: 2024-11-08 | End: 2024-11-08

## 2024-11-08 RX ORDER — PREDNISONE 20 MG/1
20 TABLET ORAL DAILY
Qty: 5 TABLET | Refills: 0 | Status: SHIPPED | OUTPATIENT
Start: 2024-11-08 | End: 2024-11-13

## 2024-11-08 ASSESSMENT — ENCOUNTER SYMPTOMS
LOSS OF SENSATION IN FEET: 0
OCCASIONAL FEELINGS OF UNSTEADINESS: 0
DEPRESSION: 0

## 2024-11-08 ASSESSMENT — PAIN SCALES - GENERAL: PAINLEVEL_OUTOF10: 0-NO PAIN

## 2024-11-08 NOTE — PROGRESS NOTES
"Subjective   Patient ID: Jose Cain is a 39 y.o. male who presents for Rash (ITCHY RASH ON LEFT ARM FOR 3 DAYS).    Rash       PT HERE WITH [place] RASH TO LEFT ARM AND FOREHEAD  Review of Systems   Skin:  Positive for rash.   All other systems reviewed and are negative.      Objective   /74 (BP Location: Right arm, Patient Position: Sitting, BP Cuff Size: Adult)   Pulse 67   Temp 37.1 °C (98.7 °F) (Skin)   Resp 20   Ht 1.778 m (5' 10\")   Wt 111 kg (245 lb)   SpO2 98%   BMI 35.15 kg/m²     Physical Exam  Vitals reviewed.   Constitutional:       Appearance: Normal appearance.   Skin:     General: Skin is warm.      Findings: Rash present.   Neurological:      Mental Status: He is alert.   Psychiatric:         Mood and Affect: Mood normal.         Behavior: Behavior normal.         Assessment/Plan   Problem List Items Addressed This Visit    None  Visit Diagnoses         Codes    Poison ivy dermatitis    -  Primary L23.7    Relevant Medications    methylPREDNISolone sodium succinate (PF) (SOLU-Medrol) injection 125 mg (Start on 11/8/2024  1:00 PM)    predniSONE (Deltasone) 20 mg tablet    fluocinonide (Lidex) 0.05 % ointment               "

## 2024-11-14 ENCOUNTER — APPOINTMENT (OUTPATIENT)
Dept: CARE COORDINATION | Facility: CLINIC | Age: 39
End: 2024-11-14
Payer: COMMERCIAL

## 2024-11-15 NOTE — PROGRESS NOTES
"Nutrition Follow-up   Dietitian follow-up. Pt is being seen at [facility] for  weight management    Labs  Lab Results   Component Value Date     09/29/2023    K 4.1 09/29/2023     09/29/2023    CO2 27 09/29/2023    BUN 11 09/29/2023    CREATININE 0.9 09/29/2023    CALCIUM 10.2 09/29/2023    ALBUMIN 4.5 09/29/2023    PROT 7.5 09/29/2023    BILITOT 1.4 (H) 09/29/2023    ALKPHOS 78 09/29/2023    ALT 28 09/29/2023    AST 27 09/29/2023    GLUCOSE 84 09/29/2023       Lab Results   Component Value Date    CHOL 177 09/29/2023    LDLCALC 115 09/29/2023    TRIG 123 09/29/2023    HDL 37 (L) 09/29/2023          Anthropometrics  Ht Readings from Last 1 Encounters:   11/08/24 1.778 m (5' 10\")       BMI Readings from Last 1 Encounters:   11/08/24 35.15 kg/m²       Wt Readings from Last 10 Encounters:   11/08/24 111 kg (245 lb)   10/01/24 115 kg (253 lb)   07/01/24 118 kg (261 lb)   04/03/24 121 kg (267 lb)   04/01/24 123 kg (272 lb)   02/01/24 127 kg (279 lb)   01/24/24 130 kg (285 lb 12.8 oz)   09/29/23 132 kg (290 lb)   09/01/23 131 kg (289 lb)   07/25/23 132 kg (291 lb 7 oz)       Weight change:     Weight in office 245 lbs, end of the day with boots on     Visit Summary  Still on Mounjaro 5mg, appetite continues to be decreased. Jose reports he continues to do well with decreased portions, he is happy eating smaller meals, feels satisfied. And is happy with eating differently in general. He has done a great job with being consistent with healthier habits. Still very physically active with his job and tasks at home. Has not been to the gym yet, but still thinking about it. They make coffee at home during the week, he will get Dawn 1-2x a week. Sweet tea on occasion, too sweet now. He chooses unsweet tea more often. He is doing really well saying no to treats at work. Packs lunches for work, usually wrap, or salad, pretzels, fruit, etc.  He continues to make effort to have vegetables at lunch and dinner, and really " listens to hunger and fullness cues. He feels confident going into the holiday season that he will continue to maintain healthy habits and approach indulgent foods in a healthy way.       Nutrition Goals    No new goals at this time, discussed continuing to be consistent with what he has been doing.  2.   Continue working on starting an exercise routine, especially as the weather gets colder      Follow up Plan  Scheduled 1/15

## 2025-01-15 ENCOUNTER — APPOINTMENT (OUTPATIENT)
Dept: CARE COORDINATION | Facility: CLINIC | Age: 40
End: 2025-01-15
Payer: COMMERCIAL

## 2025-01-16 DIAGNOSIS — E66.01 CLASS 3 SEVERE OBESITY DUE TO EXCESS CALORIES WITHOUT SERIOUS COMORBIDITY WITH BODY MASS INDEX (BMI) OF 40.0 TO 44.9 IN ADULT: ICD-10-CM

## 2025-01-16 DIAGNOSIS — E66.813 CLASS 3 SEVERE OBESITY DUE TO EXCESS CALORIES WITHOUT SERIOUS COMORBIDITY WITH BODY MASS INDEX (BMI) OF 40.0 TO 44.9 IN ADULT: ICD-10-CM

## 2025-01-16 NOTE — PROGRESS NOTES
"Nutrition Follow-up   Dietitian 8 wks/2 mo follow-up. Pt is being seen at [facility] for  weight management     Labs  Lab Results   Component Value Date     09/29/2023    K 4.1 09/29/2023     09/29/2023    CO2 27 09/29/2023    BUN 11 09/29/2023    CREATININE 0.9 09/29/2023    CALCIUM 10.2 09/29/2023    ALBUMIN 4.5 09/29/2023    PROT 7.5 09/29/2023    BILITOT 1.4 (H) 09/29/2023    ALKPHOS 78 09/29/2023    ALT 28 09/29/2023    AST 27 09/29/2023    GLUCOSE 84 09/29/2023       Lab Results   Component Value Date    CHOL 177 09/29/2023    LDLCALC 115 09/29/2023    TRIG 123 09/29/2023    HDL 37 (L) 09/29/2023          Anthropometrics  Ht Readings from Last 1 Encounters:   11/08/24 1.778 m (5' 10\")       BMI Readings from Last 1 Encounters:   11/08/24 35.15 kg/m²       Wt Readings from Last 10 Encounters:   11/08/24 111 kg (245 lb)   10/01/24 115 kg (253 lb)   07/01/24 118 kg (261 lb)   04/03/24 121 kg (267 lb)   04/01/24 123 kg (272 lb)   02/01/24 127 kg (279 lb)   01/24/24 130 kg (285 lb 12.8 oz)   09/29/23 132 kg (290 lb)   09/01/23 131 kg (289 lb)   07/25/23 132 kg (291 lb 7 oz)         Visit Summary  Jose has been off Mounjaro for 2 weeks, had difficulty getting his prior auth in the new year. He is happy he has maintained his weight through the holidays, no weight gain.  248 lbs at home. 250 lbs in office  He reports no changes in hunger since being off medication. He is still working hard to be consistent with the healthy habits he has developed. He states he has learned a lot about himself and his behaviors. He is doing well with water intake, sparkling water. He is back to Friday harman treats, coffee made at home the rest of the time. He prefers coffee made at home now, tastes better. He continues to be consistent with mindful eating. He has officially signed up for the gym and plans to start soon. He is going to work on changing his mindset around exercise. Rather than saying I have to work out, he is " going to view it as something positive that he wants to do. He has some questions on what he should focus on eating now that he is not on Mounjaro. We discussed plate method, prioritizing protein, prioritizing fiber. I also emailed additional resources and recipes.    Nutrition Goals    Work on prioritizing fiber and protein  2.   Continue working on starting exercise routine      Follow up Plan  Scheduled 2/12

## 2025-01-20 RX ORDER — TIRZEPATIDE 5 MG/.5ML
5 INJECTION, SOLUTION SUBCUTANEOUS
Qty: 6 ML | Refills: 1 | Status: SHIPPED | OUTPATIENT
Start: 2025-01-26 | End: 2025-01-22 | Stop reason: ALTCHOICE

## 2025-01-21 NOTE — TELEPHONE ENCOUNTER
Left patient a voicemail notifying that per insurance Mounjaro is denied due to patient not having a diagnosis of type 2 diabetes.

## 2025-01-22 ENCOUNTER — PATIENT MESSAGE (OUTPATIENT)
Dept: PHARMACY | Facility: HOSPITAL | Age: 40
End: 2025-01-22
Payer: COMMERCIAL

## 2025-01-22 DIAGNOSIS — E66.813 CLASS 3 SEVERE OBESITY DUE TO EXCESS CALORIES WITHOUT SERIOUS COMORBIDITY WITH BODY MASS INDEX (BMI) OF 40.0 TO 44.9 IN ADULT: Primary | ICD-10-CM

## 2025-01-22 DIAGNOSIS — G47.33 OSA (OBSTRUCTIVE SLEEP APNEA): ICD-10-CM

## 2025-01-22 DIAGNOSIS — E66.01 CLASS 3 SEVERE OBESITY DUE TO EXCESS CALORIES WITHOUT SERIOUS COMORBIDITY WITH BODY MASS INDEX (BMI) OF 40.0 TO 44.9 IN ADULT: Primary | ICD-10-CM

## 2025-01-22 RX ORDER — TIRZEPATIDE 2.5 MG/.5ML
2.5 INJECTION, SOLUTION SUBCUTANEOUS
Qty: 2 ML | Refills: 0 | Status: SHIPPED | OUTPATIENT
Start: 2025-01-22 | End: 2025-01-23 | Stop reason: DRUGHIGH

## 2025-01-23 DIAGNOSIS — E66.813 CLASS 3 SEVERE OBESITY DUE TO EXCESS CALORIES WITHOUT SERIOUS COMORBIDITY WITH BODY MASS INDEX (BMI) OF 40.0 TO 44.9 IN ADULT: Primary | ICD-10-CM

## 2025-01-23 DIAGNOSIS — E66.01 CLASS 3 SEVERE OBESITY DUE TO EXCESS CALORIES WITHOUT SERIOUS COMORBIDITY WITH BODY MASS INDEX (BMI) OF 40.0 TO 44.9 IN ADULT: Primary | ICD-10-CM

## 2025-01-23 RX ORDER — TIRZEPATIDE 5 MG/.5ML
5 INJECTION, SOLUTION SUBCUTANEOUS
Qty: 2 ML | Refills: 2 | Status: SHIPPED | OUTPATIENT
Start: 2025-01-23

## 2025-01-27 ENCOUNTER — APPOINTMENT (OUTPATIENT)
Dept: PULMONOLOGY | Facility: CLINIC | Age: 40
End: 2025-01-27
Payer: COMMERCIAL

## 2025-01-29 ENCOUNTER — APPOINTMENT (OUTPATIENT)
Dept: SLEEP MEDICINE | Facility: CLINIC | Age: 40
End: 2025-01-29
Payer: COMMERCIAL

## 2025-02-05 ENCOUNTER — APPOINTMENT (OUTPATIENT)
Dept: SLEEP MEDICINE | Facility: CLINIC | Age: 40
End: 2025-02-05
Payer: COMMERCIAL

## 2025-02-12 ENCOUNTER — APPOINTMENT (OUTPATIENT)
Dept: CARE COORDINATION | Facility: CLINIC | Age: 40
End: 2025-02-12
Payer: COMMERCIAL

## 2025-02-13 NOTE — PROGRESS NOTES
"Nutrition Follow-up   Dietitian 1 mo  follow-up. Pt is being seen at [facility] for weight management    Labs  Lab Results   Component Value Date     09/29/2023    K 4.1 09/29/2023     09/29/2023    CO2 27 09/29/2023    BUN 11 09/29/2023    CREATININE 0.9 09/29/2023    CALCIUM 10.2 09/29/2023    ALBUMIN 4.5 09/29/2023    PROT 7.5 09/29/2023    BILITOT 1.4 (H) 09/29/2023    ALKPHOS 78 09/29/2023    ALT 28 09/29/2023    AST 27 09/29/2023    GLUCOSE 84 09/29/2023       Lab Results   Component Value Date    CHOL 177 09/29/2023    LDLCALC 115 09/29/2023    TRIG 123 09/29/2023    HDL 37 (L) 09/29/2023         Anthropometrics  Ht Readings from Last 1 Encounters:   11/08/24 1.778 m (5' 10\")       BMI Readings from Last 1 Encounters:   11/08/24 35.15 kg/m²       Wt Readings from Last 10 Encounters:   11/08/24 111 kg (245 lb)   10/01/24 115 kg (253 lb)   07/01/24 118 kg (261 lb)   04/03/24 121 kg (267 lb)   04/01/24 123 kg (272 lb)   02/01/24 127 kg (279 lb)   01/24/24 130 kg (285 lb 12.8 oz)   09/29/23 132 kg (290 lb)   09/01/23 131 kg (289 lb)   07/25/23 132 kg (291 lb 7 oz)       Weight change:     254 lbs in office, up 4 lbs since last visit    Visit Summary  Jose explains he is no longer approved for Mounjaro, they put in an rx for Zepbound but it was too expensive. He has made many lifestyle changes so he feels confident he can continue losing weight without it. He knows some weight rebound is normal, hunger has increased a little, he is having cravings more often. But, he is doing really well being mindful and not getting out of control. He continues to have a small breakfast, usually trail mix, sometimes cereal. Brings lunch to work, tries to focus on protein and fiber. When he feels a craving come on, he fills up his water bottle and drinks more. He continues to only have a harman coffee on Fridays as a treat. They continue to eat out less and make most meals at home. He has not started at the gym yet, " still in planning phase. They are considering going to the [facility].           Nutrition Goals    Continue working on prioritizing fiber and protein  2.   Continue working on starting exercise routine      Follow up Plan  Scheduled for 4/1

## 2025-03-11 ENCOUNTER — APPOINTMENT (OUTPATIENT)
Dept: SLEEP MEDICINE | Facility: CLINIC | Age: 40
End: 2025-03-11
Payer: COMMERCIAL

## 2025-03-18 ENCOUNTER — APPOINTMENT (OUTPATIENT)
Dept: SLEEP MEDICINE | Facility: CLINIC | Age: 40
End: 2025-03-18
Payer: COMMERCIAL

## 2025-03-18 VITALS
HEIGHT: 70 IN | DIASTOLIC BLOOD PRESSURE: 80 MMHG | OXYGEN SATURATION: 96 % | HEART RATE: 79 BPM | BODY MASS INDEX: 35.79 KG/M2 | WEIGHT: 250 LBS | SYSTOLIC BLOOD PRESSURE: 132 MMHG

## 2025-03-18 DIAGNOSIS — I10 BENIGN HYPERTENSION: ICD-10-CM

## 2025-03-18 DIAGNOSIS — G47.33 OSA (OBSTRUCTIVE SLEEP APNEA): Primary | ICD-10-CM

## 2025-03-18 PROCEDURE — 99213 OFFICE O/P EST LOW 20 MIN: CPT | Performed by: PHYSICIAN ASSISTANT

## 2025-03-18 PROCEDURE — 3008F BODY MASS INDEX DOCD: CPT | Performed by: PHYSICIAN ASSISTANT

## 2025-03-18 PROCEDURE — 3079F DIAST BP 80-89 MM HG: CPT | Performed by: PHYSICIAN ASSISTANT

## 2025-03-18 PROCEDURE — 3075F SYST BP GE 130 - 139MM HG: CPT | Performed by: PHYSICIAN ASSISTANT

## 2025-03-18 PROCEDURE — 1036F TOBACCO NON-USER: CPT | Performed by: PHYSICIAN ASSISTANT

## 2025-03-18 ASSESSMENT — PAIN SCALES - GENERAL: PAINLEVEL_OUTOF10: 0-NO PAIN

## 2025-03-18 ASSESSMENT — SLEEP AND FATIGUE QUESTIONNAIRES
HOW LIKELY ARE YOU TO NOD OFF OR FALL ASLEEP WHEN YOU ARE A PASSENGER IN A CAR FOR AN HOUR WITHOUT A BREAK: HIGH CHANCE OF DOZING
HOW LIKELY ARE YOU TO NOD OFF OR FALL ASLEEP WHILE LYING DOWN TO REST IN THE AFTERNOON WHEN CIRCUMSTANCES PERMIT: MODERATE CHANCE OF DOZING
HOW LIKELY ARE YOU TO NOD OFF OR FALL ASLEEP WHILE SITTING AND READING: MODERATE CHANCE OF DOZING
ESS-CHAD TOTAL SCORE: 14
HOW LIKELY ARE YOU TO NOD OFF OR FALL ASLEEP WHILE SITTING AND TALKING TO SOMEONE: SLIGHT CHANCE OF DOZING
HOW LIKELY ARE YOU TO NOD OFF OR FALL ASLEEP WHILE SITTING QUIETLY AFTER LUNCH WITHOUT ALCOHOL: SLIGHT CHANCE OF DOZING
HOW LIKELY ARE YOU TO NOD OFF OR FALL ASLEEP WHILE WATCHING TV: HIGH CHANCE OF DOZING
SITING INACTIVE IN A PUBLIC PLACE LIKE A CLASS ROOM OR A MOVIE THEATER: MODERATE CHANCE OF DOZING
HOW LIKELY ARE YOU TO NOD OFF OR FALL ASLEEP IN A CAR, WHILE STOPPED FOR A FEW MINUTES IN TRAFFIC: WOULD NEVER DOZE

## 2025-03-18 NOTE — PROGRESS NOTES
"[facility]  Followup Visit Note    HISTORY OF PRESENT ILLNESS   Jose Cain is a 40 y.o. male who presents to a [facility] for followup.       Current History    On today's visit, the patient reports he wants to restart using CPAP consistently. He did feel better on CPAP in past.    He tried multiple masks including the N30i and full face masks but did not tolerate them well. The N20 was the best he tried.  Pressure settings did not bother him.    He had rainout in past but wasn't an issue more recently. Not having dry mouth or sore throat.    PAP Adherence:  Most recent data      Sleep Scales:  ESS: 14     REVIEW OF SYSTEMS    All other systems negative      PHYSICAL EXAM     VITAL SIGNS: /80   Pulse 79   Ht 1.778 m (5' 10\")   Wt 113 kg (250 lb)   SpO2 96%   BMI 35.87 kg/m²      PREVIOUS WEIGHTS:  Wt Readings from Last 3 Encounters:   03/18/25 113 kg (250 lb)   11/08/24 111 kg (245 lb)   10/01/24 115 kg (253 lb)       Constitutional: Alert and oriented, cooperative, no obvious distress   HEENT: Non icteric or anemic, EOM WNL bilaterally   Neck: Supple, no JVD, no goiter, no adenopathy, no rigidity  Extremities: No clubbing, no LL edema   Neuromuscular: Cranial nerves grossly intact, no focal deficits     RESULTS/DATA     No results found for: \"IRON\", \"TRANSFERRIN\", \"IRONSAT\", \"TIBC\", \"FERRITIN\"      ASSESSMENT/PLAN     Mr. Cain is a 40 y.o. male and returns in followup for the following issues:    OBSTRUCTIVE SLEEP APNEA  -Benefiting from CPAP  -No recent use but CONSTANTINO was well controlled per download  -supplies ordered HCS  -sample alan 2 provided    BMI>35  -Body mass index is 35.87 kg/m².  today    HYPERTENSION  BP Readings from Last 3 Encounters:   03/18/25 132/80   11/08/24 122/74   10/01/24 128/80      -would benefit from CPAP restart    Follow up 1 year       "

## 2025-04-01 ENCOUNTER — APPOINTMENT (OUTPATIENT)
Dept: CARE COORDINATION | Facility: CLINIC | Age: 40
End: 2025-04-01
Payer: COMMERCIAL

## 2025-05-05 ENCOUNTER — APPOINTMENT (OUTPATIENT)
Dept: CARE COORDINATION | Facility: CLINIC | Age: 40
End: 2025-05-05
Payer: COMMERCIAL

## 2025-05-06 NOTE — PROGRESS NOTES
"Nutrition Follow-up   Dietitian 8 wks/2 mo follow-up. Pt is being seen at [facility] for weight management    Labs  Lab Results   Component Value Date     09/29/2023    K 4.1 09/29/2023     09/29/2023    CO2 27 09/29/2023    BUN 11 09/29/2023    CREATININE 0.9 09/29/2023    CALCIUM 10.2 09/29/2023    ALBUMIN 4.5 09/29/2023    PROT 7.5 09/29/2023    BILITOT 1.4 (H) 09/29/2023    ALKPHOS 78 09/29/2023    ALT 28 09/29/2023    AST 27 09/29/2023    GLUCOSE 84 09/29/2023       Lab Results   Component Value Date    CHOL 177 09/29/2023    LDLCALC 115 09/29/2023    TRIG 123 09/29/2023    HDL 37 (L) 09/29/2023          Anthropometrics  Ht Readings from Last 1 Encounters:   03/18/25 1.778 m (5' 10\")       BMI Readings from Last 1 Encounters:   03/18/25 35.87 kg/m²       Wt Readings from Last 10 Encounters:   03/18/25 113 kg (250 lb)   11/08/24 111 kg (245 lb)   10/01/24 115 kg (253 lb)   07/01/24 118 kg (261 lb)   04/03/24 121 kg (267 lb)   04/01/24 123 kg (272 lb)   02/01/24 127 kg (279 lb)   01/24/24 130 kg (285 lb 12.8 oz)   09/29/23 132 kg (290 lb)   09/01/23 131 kg (289 lb)       Weight change:      lbs  Continues without weight loss meds    Visit Summary  Jose reports he has been working in [employer] and will be there another month. This has made his routine a lot different. His job has been very physical and he is getting in a lot more steps. He is happy to have lost a few lbs with the circumstances. He also had some big news and has a lot going on at home. There have been a lot more treats available at work, he will indulge sometimes but tries to limit and not make it a habit. He has been getting harman coffee more often since he is up so early. He has been eating a protein bar for breakfast, lunch he might skip or has 6 in sub sandwich. Will maybe have some chips on his way home or nuts. Dinners vary depending on what they have going on. He is eating out more but he makes healthier choices. His also " continues to choose smaller portions. He is doing well with water intake, also has sparkling flavored water. He does not have room for any new goals currently, will just continue being consistent with what he has been doing. He finds our follow up visits are very helpful. All questions answered today.      Nutrition Diagnosis:  Diagnosis Statement 1:  Diagnosis Status: Ongoing  Diagnosis: Overweight related to food and nutrition related knowledge deficit concerning energy intake as evidenced by consumption of energy-dense, nutrient poor food or beverage and estimated energy intake is more than estimated needs, request for nutrition education      Nutrition Goals    Continue working on limiting treat foods  2.   Continue working on prioritizing fiber and protein      Follow up Plan  Scheduled for 7/9

## 2025-07-09 ENCOUNTER — APPOINTMENT (OUTPATIENT)
Dept: CARE COORDINATION | Facility: CLINIC | Age: 40
End: 2025-07-09
Payer: COMMERCIAL

## 2025-07-16 ENCOUNTER — APPOINTMENT (OUTPATIENT)
Dept: CARE COORDINATION | Facility: CLINIC | Age: 40
End: 2025-07-16
Payer: COMMERCIAL

## 2025-07-23 ENCOUNTER — APPOINTMENT (OUTPATIENT)
Dept: CARE COORDINATION | Facility: CLINIC | Age: 40
End: 2025-07-23
Payer: COMMERCIAL

## 2025-07-24 NOTE — PROGRESS NOTES
"Nutrition Follow-up   Dietitian 8 wks/2 mo follow-up. Pt is being seen at [facility] for weight management    Labs  Lab Results   Component Value Date     09/29/2023    K 4.1 09/29/2023     09/29/2023    CO2 27 09/29/2023    BUN 11 09/29/2023    CREATININE 0.9 09/29/2023    CALCIUM 10.2 09/29/2023    ALBUMIN 4.5 09/29/2023    PROT 7.5 09/29/2023    BILITOT 1.4 (H) 09/29/2023    ALKPHOS 78 09/29/2023    ALT 28 09/29/2023    AST 27 09/29/2023    GLUCOSE 84 09/29/2023       Lab Results   Component Value Date    CHOL 177 09/29/2023    LDLCALC 115 09/29/2023    TRIG 123 09/29/2023    HDL 37 (L) 09/29/2023              Anthropometrics  Ht Readings from Last 1 Encounters:   03/18/25 1.778 m (5' 10\")       BMI Readings from Last 1 Encounters:   03/18/25 35.87 kg/m²       Wt Readings from Last 10 Encounters:   03/18/25 113 kg (250 lb)   11/08/24 111 kg (245 lb)   10/01/24 115 kg (253 lb)   07/01/24 118 kg (261 lb)   04/03/24 121 kg (267 lb)   04/01/24 123 kg (272 lb)   02/01/24 127 kg (279 lb)   01/24/24 130 kg (285 lb 12.8 oz)   09/29/23 132 kg (290 lb)   09/01/23 131 kg (289 lb)       Weight change:     Gained a few lbs. Now at ~257    Visit Summary  Jose reports doing okay considering he is traveling off site daily for his job. This has made it difficult to make healthy choices and have healthy options available. He is getting harman in the morning, iced coffee and egg, klein and cheese bagel. In the afternoon he will get harman sparked drink. He starts his day very early and is tired by the time his shift is over. Sometimes stops at gas station for a snack. He continues to be mindful of choosing smaller portions, and minimizes times of mindless or stress eating. He does not want to gain more weight and fall back into old habits. He is planning on getting back to his routine soon, as his project will be over at work and he will be back at his normal location. He misses his healthy habits and looks forward to " getting back on track. We discussed a few ways to set himself up for success.       Nutrition Diagnosis:  Diagnosis Statement 1:  Diagnosis Status: Ongoing  Diagnosis: Overweight related to food and nutrition related knowledge deficit concerning energy intake as evidenced by consumption of energy-dense, nutrient poor food or beverage and estimated energy intake is more than estimated needs, request for nutrition education        Nutrition Goals    Work on getting back to regular routine       Follow up Plan  Scheduled 9/23

## 2025-09-23 ENCOUNTER — APPOINTMENT (OUTPATIENT)
Dept: CARE COORDINATION | Facility: CLINIC | Age: 40
End: 2025-09-23
Payer: COMMERCIAL